# Patient Record
Sex: FEMALE | Race: WHITE | NOT HISPANIC OR LATINO | Employment: STUDENT | URBAN - METROPOLITAN AREA
[De-identification: names, ages, dates, MRNs, and addresses within clinical notes are randomized per-mention and may not be internally consistent; named-entity substitution may affect disease eponyms.]

---

## 2018-01-09 PROCEDURE — 99282 EMERGENCY DEPT VISIT SF MDM: CPT

## 2018-01-10 ENCOUNTER — HOSPITAL ENCOUNTER (EMERGENCY)
Facility: HOSPITAL | Age: 18
Discharge: HOME | End: 2018-01-10
Attending: EMERGENCY MEDICINE | Admitting: EMERGENCY MEDICINE

## 2018-01-10 ENCOUNTER — APPOINTMENT (OUTPATIENT)
Dept: GENERAL RADIOLOGY | Facility: HOSPITAL | Age: 18
End: 2018-01-10

## 2018-01-10 VITALS
DIASTOLIC BLOOD PRESSURE: 91 MMHG | RESPIRATION RATE: 18 BRPM | OXYGEN SATURATION: 99 % | TEMPERATURE: 98.2 F | BODY MASS INDEX: 23.54 KG/M2 | HEART RATE: 80 BPM | SYSTOLIC BLOOD PRESSURE: 135 MMHG | WEIGHT: 150 LBS | HEIGHT: 67 IN

## 2018-01-10 DIAGNOSIS — R59.0 ANTERIOR CERVICAL ADENOPATHY: Primary | ICD-10-CM

## 2018-01-10 DIAGNOSIS — Z86.19 HISTORY OF EPSTEIN-BARR VIRUS INFECTION: ICD-10-CM

## 2018-01-10 DIAGNOSIS — H66.002 ACUTE SUPPURATIVE OTITIS MEDIA OF LEFT EAR WITHOUT SPONTANEOUS RUPTURE OF TYMPANIC MEMBRANE, RECURRENCE NOT SPECIFIED: ICD-10-CM

## 2018-01-10 PROCEDURE — 71046 X-RAY EXAM CHEST 2 VIEWS: CPT

## 2018-01-10 RX ORDER — NORETHINDRONE ACETATE AND ETHINYL ESTRADIOL 1; 5 MG/1; UG/1
1 TABLET ORAL DAILY
COMMUNITY

## 2018-01-10 RX ORDER — AMOXICILLIN 875 MG/1
875 TABLET, COATED ORAL EVERY 12 HOURS SCHEDULED
Qty: 20 TABLET | Refills: 0 | Status: SHIPPED | OUTPATIENT
Start: 2018-01-10

## 2018-01-10 NOTE — ED PROVIDER NOTES
Subjective   HPI Comments: Geneva presents with a complaint of a knot on the left side of her neck.  Her mom tells me it's been there for greater than a month and seems to be worsening.  It started about the same time she was diagnosed with mono.  Her mom tells me that she had malaise and upper respiratory symptoms and a negative Monospot however the mono test was repeated and turns positive.  Furthermore her boyfriend was diagnosed with mono.  She tells me she mostly has got over those symptoms although still has some runny nose.  She denies sore throat.  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  Geneva tells me she just finished a 10 day course of Bactrim for a red spot on the left side of her neck.  She and her mother report that the red spot has improved significantly since then.  She denies having any fevers or chills.  She tells me the knot is just painful when she moves.  She has no primary care provider.  She has received her care in an urgent treatment setting up to this point.    Patient is a 17 y.o. female presenting with neck pain.   History provided by:  Patient and parent  Neck Pain   Pain location:  L side  Onset quality:  Gradual  Timing:  Constant  Progression:  Worsening  Chronicity:  New  Relieved by:  Nothing  Exacerbated by: Movement.  Associated symptoms: no chest pain and no fever        Review of Systems   Constitutional: Negative for chills and fever.   HENT: Positive for congestion. Negative for sore throat.    Respiratory: Negative for cough and shortness of breath.    Cardiovascular: Negative for chest pain.   Musculoskeletal: Positive for neck pain.   All other systems reviewed and are negative.      History reviewed. No pertinent past medical history.    No Known Allergies    History reviewed. No pertinent  surgical history.    History reviewed. No pertinent family history.    Social History     Social History   • Marital status: Single     Spouse name: N/A   • Number of children: N/A   • Years of education: N/A     Social History Main Topics   • Smoking status: Never Smoker   • Smokeless tobacco: Never Used   • Alcohol use No   • Drug use: No   • Sexual activity: Defer     Other Topics Concern   • None     Social History Narrative   • None           Objective   Physical Exam   Constitutional: She appears well-developed and well-nourished.   HENT:   Head: Normocephalic.   Right TM is mildly erythematous but landmarks are visible, left TM is more red and landmarks are not visible.  There is a firm tender knot at the base of her left sternocleidomastoid muscle.  It is not fluctuant and the skin overlying it is not red.  I don't palpate any supraclavicular adenopathy.  I don't palpate adenopathy anywhere else on her neck or scalp there is a tiny red dot about an inch and a half lateral to the knot.  Her mom tells me that's where she had the infection for which she was placed on Bactrim.  Posterior pharynx is pink and moist without any erythema   Eyes: Conjunctivae are normal. Pupils are equal, round, and reactive to light. No scleral icterus.   Neck: Normal range of motion. Neck supple.   Single large not consistent with a lymph node as above, it's about 3 cm long.  It is nonpulsatile   Cardiovascular: Normal rate and regular rhythm.    No murmur heard.  Pulmonary/Chest: Effort normal and breath sounds normal. No respiratory distress. She has no wheezes.   Lymphadenopathy:     She has cervical adenopathy.   Neurological: She is alert.   Skin: Skin is warm and dry. No rash noted.   Psychiatric: She has a normal mood and affect. Her behavior is normal.   Nursing note and vitals reviewed.      Procedures         ED Course  ED Course   Comment By Time   I performed bedside ultrasound and I don't see a discrete fluid  collection.  I have advised them that this is likely related to her recent Kings-Barr infection.  She does have a left otitis media and I will place her on antibiotics for that although I suspect this is indeed an enlarged lymph node related to recent Kings-Mike.  I will have her follow-up with ENT for further evaluation and possible treatment.  I have advised her that unfortunately I can't think of away to make this go away before next weekend although I don't see any danger in her participating in a cheerleading competition.  I will obtain a chest x-ray to evaluate for adenopathy in her chest Gee Blas MD 01/10 5310   I have reviewed her chest x-ray and there is no mass or adenopathy of the mediastinum her chest. Gee Blas MD 01/10 [id number]                  MDM  Number of Diagnoses or Management Options  Acute suppurative otitis media of left ear without spontaneous rupture of tympanic membrane, recurrence not specified: new and does not require workup  Anterior cervical adenopathy: new and requires workup  History of Kings-Barr virus infection:      Amount and/or Complexity of Data Reviewed  Tests in the radiology section of CPT®: ordered and reviewed        Final diagnoses:   Anterior cervical adenopathy   History of Kings-Barr virus infection   Acute suppurative otitis media of left ear without spontaneous rupture of tympanic membrane, recurrence not specified            Gee Blas MD  01/15/18 1019